# Patient Record
Sex: MALE | Race: ASIAN
[De-identification: names, ages, dates, MRNs, and addresses within clinical notes are randomized per-mention and may not be internally consistent; named-entity substitution may affect disease eponyms.]

---

## 2019-06-20 ENCOUNTER — HOSPITAL ENCOUNTER (EMERGENCY)
Dept: HOSPITAL 61 - ER | Age: 49
Discharge: HOME | End: 2019-06-20
Payer: COMMERCIAL

## 2019-06-20 VITALS — HEIGHT: 65 IN | BODY MASS INDEX: 24.99 KG/M2 | WEIGHT: 150 LBS

## 2019-06-20 VITALS — SYSTOLIC BLOOD PRESSURE: 123 MMHG | DIASTOLIC BLOOD PRESSURE: 75 MMHG

## 2019-06-20 DIAGNOSIS — F41.9: ICD-10-CM

## 2019-06-20 DIAGNOSIS — G89.29: Primary | ICD-10-CM

## 2019-06-20 DIAGNOSIS — M25.561: ICD-10-CM

## 2019-06-20 LAB
APTT PPP: YELLOW S
BACTERIA #/AREA URNS HPF: 0 /HPF
BILIRUB UR QL STRIP: NEGATIVE
FIBRINOGEN PPP-MCNC: CLEAR MG/DL
NITRITE UR QL STRIP: NEGATIVE
PH UR STRIP: 5 [PH]
PROT UR STRIP-MCNC: NEGATIVE MG/DL
RBC #/AREA URNS HPF: 0 /HPF (ref 0–2)
SQUAMOUS #/AREA URNS LPF: (no result) /LPF
UROBILINOGEN UR-MCNC: 0.2 MG/DL
WBC #/AREA URNS HPF: (no result) /HPF (ref 0–4)

## 2019-06-20 PROCEDURE — 82962 GLUCOSE BLOOD TEST: CPT

## 2019-06-20 PROCEDURE — 81001 URINALYSIS AUTO W/SCOPE: CPT

## 2019-06-20 PROCEDURE — 99285 EMERGENCY DEPT VISIT HI MDM: CPT

## 2019-06-20 PROCEDURE — 73564 X-RAY EXAM KNEE 4 OR MORE: CPT

## 2019-06-20 NOTE — RAD
KNEE RIGHT 4V

 

History: Chronic knee pain

 

Comparison: None.

 

Findings:

3 views right knee are submitted. No acute fracture or dislocation is 

identified. There is likely suprapatellar joint effusion.

 

Impression: 

 

1.  No acute osseous abnormality is identified. There is likely 

suprapatellar joint effusion.

 

Electronically signed by: Winston Sarmiento MD (6/20/2019 3:09 PM) 

[email or web address]

## 2019-06-20 NOTE — PHYS DOC
Past Medical History


Past Medical History:  No Pertinent History


Past Surgical History:  No Surgical History


Alcohol Use:  None


Drug Use:  None





Adult General


Chief Complaint


Chief Complaint:  LOWER EXT PAIN





HPI


HPI





Patient is a 48  year old male who presents with several different complaints. 

Patient is not talking English and history was taking with [employer] 

service. Patient complaining of multiple chronic problems including right knee 

pain for 4 years and redness of his feet and eye watering and urinating 

frequently since . Patient came to the [facility] in   and did not have 

follow-up with a primary care physician for his chronic problem.





Review of Systems


Review of Systems





Constitutional: Denies fever or chills []


Eyes: Denies change in visual acuity, redness, or eye pain []


HENT: Denies nasal congestion or sore throat []


Respiratory: Denies cough or shortness of breath []


Cardiovascular: No additional information not addressed in HPI []


GI: Denies abdominal pain, nausea, vomiting, bloody stools or diarrhea []


: Denies dysuria or hematuria , frequent urination[]


Musculoskeletal: Denies back pain, reports joint pain []


Integument: Denies rash or skin lesions []


Neurologic: Denies headache, focal weakness or sensory changes []


Endocrine: Denies polyuria or polydipsia []





All other systems were reviewed and found to be within normal limits, except as 

documented in this note.





Allergies


Allergies





Allergies








Coded Allergies Type Severity Reaction Last Updated Verified


 


  No Known Drug Allergies    19 No











Physical Exam


Physical Exam





Constitutional: Well developed, well nourished, no acute distress, non-toxic 

appearance. []


HENT: Normocephalic, atraumatic


Eyes: PERRLA, EOMI, conjunctiva normal, no discharge. [] 


Neck: Normal range of motion, no tenderness, supple, no stridor. [] 


Cardiovascular:Heart rate regular rhythm, no murmur []


Lungs & Thorax:  Bilateral breath sounds clear to auscultation []


Skin: Warm, dry, no erythema, no rash. [] 


Back: No tenderness, no CVA tenderness. [] 


Extremities: No tenderness, no cyanosis, no clubbing, ROM intact, no edema. 

Right knee without deformity or sign of injury, normal range of motion. [] 


Neurologic: Alert and oriented X 3, normal motor function, normal sensory 

function, no focal deficits noted. []


Psychologic: Affect normal, judgement normal, mood normal. []





Current Patient Data


Vital Signs





                                   Vital Signs








  Date Time  Temp Pulse Resp B/P (MAP) Pulse Ox O2 Delivery O2 Flow Rate FiO2


 


19 13:27 98.4 84 16 123/75 (91) 97 Room Air  





 98.4       








Lab Values





                                Laboratory Tests








Test


 19


13:57 19


14:15


 


Glucose (Fingerstick)


 187 mg/dL


(70-99)  H 





 


Urine Color  Yellow  


 


Urine Clarity  Clear  


 


Urine pH  5.0  


 


Urine Specific Gravity  1.025  


 


Urine Protein


 


 Negative mg/dL


(NEG-TRACE)


 


Urine Glucose (UA)


 


 250 mg/dL


(NEG)


 


Urine Ketones (Stick)


 


 Negative mg/dL


(NEG)


 


Urine Blood


 


 Negative (NEG)





 


Urine Nitrite


 


 Negative (NEG)





 


Urine Bilirubin


 


 Negative (NEG)





 


Urine Urobilinogen Dipstick


 


 0.2 mg/dL (0.2


mg/dL)


 


Urine Leukocyte Esterase


 


 Negative (NEG)





 


Urine RBC  0 /HPF (0-2)  


 


Urine WBC


 


 Occ /HPF (0-4)





 


Urine Squamous Epithelial


Cells 


 Occ /LPF  





 


Urine Bacteria


 


 0 /HPF (0-FEW)














EKG


EKG


[]





Radiology/Procedures


Radiology/Procedures


[][facility]


[address]  [place], [place] [id number] [phone]





IMAGING REPORT





Signed





PATIENT: URSULA DOE   ACCOUNT: [id number]   MRN#: [id number]


: 1970   LOCATION: ER   AGE: 48


SEX: M   EXAM DT: 19   ACCESSION#: [id number]


STATUS: REG ER   ORD. PHYSICIAN: RYAN DE MD   


REASON: chronic knee pain


PROCEDURE: KNEE RIGHT 4V





KNEE RIGHT 4V


 


History: Chronic knee pain


 


Comparison: None.


 


Findings:


3 views right knee are submitted. No acute fracture or dislocation is 


identified. There is likely suprapatellar joint effusion.


 


Impression: 


 


1.  No acute osseous abnormality is identified. There is likely 


suprapatellar joint effusion.


 


Electronically signed by: Romaine Gabriel MD (2019 3:09 PM) 


[email or web address]














DICTATED and SIGNED BY:     ROMAINE GABRIEL MD


DATE:     19 6430





Course & Med Decision Making


Course & Med Decision Making


Pertinent Labs and Imaging studies reviewed. (See chart for details)





Evaluation of patient in ER showed 48-year-old non English speaking  patient 

with complaining of multiple medical problems for a long time. Patient had 

unremarkable physical exam with  blood sugar of 186 and unremarkable UA and x 

ray  for right knee. He was advised to follow up with her primary care physician

 for chronic problem.





Dragon Disclaimer


Dragon Disclaimer


This electronic medical record was generated, in whole or in part, using a voice

 recognition dictation system.





Departure


Departure


Impression:  


   Primary Impression:  


   Anxiety about health


   Additional Impression:  


   Chronic pain


Disposition:   HOME, SELF-CARE (at 1525)


Condition:  STABLE


Patient Instructions:  Chronic Pain, Chronic Pain Management





Additional Instructions:  





Follow-up with your primary care physician in 3-5 days


Return to ER if not getting better


Scripts


Naproxen (NAPROSYN) 500 Mg Tablet


1 TAB PO BID for pain, #20 TAB


   Prov: RYAN DE MD         19





Problem Qualifiers








   Additional Impression:  


   Chronic pain


   Chronic pain type:  other chronic pain  Qualified Codes:  G89.29 - Other 

   chronic pain








RYAN DE MD             2019 14:13

## 2021-01-02 ENCOUNTER — HOSPITAL ENCOUNTER (EMERGENCY)
Dept: HOSPITAL 61 - ER | Age: 51
End: 2021-01-02
Payer: COMMERCIAL

## 2021-01-02 VITALS — BODY MASS INDEX: 26.17 KG/M2 | HEIGHT: 63 IN | WEIGHT: 147.71 LBS

## 2021-01-02 VITALS — DIASTOLIC BLOOD PRESSURE: 76 MMHG | SYSTOLIC BLOOD PRESSURE: 132 MMHG
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
## 2021-01-02 DIAGNOSIS — S81.811A: Primary | ICD-10-CM

## 2021-01-02 PROCEDURE — 12002 RPR S/N/AX/GEN/TRNK2.6-7.5CM: CPT

## 2021-01-02 PROCEDURE — 99283 EMERGENCY DEPT VISIT LOW MDM: CPT

## 2021-01-02 PROCEDURE — 90471 IMMUNIZATION ADMIN: CPT

## 2021-01-02 PROCEDURE — 90715 TDAP VACCINE 7 YRS/> IM: CPT

## 2021-01-02 NOTE — ED.ADGEN
Past Medical History


Past Medical History:  No Pertinent History


Past Surgical History:  No Surgical History


Smoking Status:  Never Smoker


Alcohol Use:  None


Drug Use:  None





General Adult


EDM:


Chief Complaint:  LACERATION/AVULSION





HPI:


HPI:





Patient is a 50  year old male who presents emergency department with complaints

of a laceration to his right lower leg.  Patient reports he was at home when a 

broken glass fell and cut his leg.  He is unsure when his last tetanus shot was.

 Patient denies any decreased range of motion, numbness, tingling, or weakness 

of the affected extremity.  He denies any decreased sensation.  The patient 

currently denies any pain.





Review of Systems:


Review of Systems:


Complete ROS is negative unless otherwise noted in HPI.





Current Medications:





Current Medications








 Medications


  (Trade)  Dose


 Ordered  Sig/Jf  Start Time


 Stop Time Status Last Admin


Dose Admin


 


 Diphtheria/


 Tetanus/Acell


 Pertussis


  (ADACEL TDap


 SYRINGE)  0.5 ml  ONCE ONCE  1/2/21 13:15


 1/2/21 13:16 DC  





 


 Lidocaine/


 Epinephrine


  (LIDOCAINE


 1%-EPI 1:100,000


 Multi-Dose)  1 ml  1X  ONCE  1/2/21 12:00


 1/2/21 12:01 DC  














Allergies:


Allergies:





Allergies








Coded Allergies Type Severity Reaction Last Updated Verified


 


  No Known Drug Allergies    6/20/19 No











Physical Exam:


PE:


See Above


Constitutional: Well developed, well nourished, no acute distress, non-toxic 

appearance. []


HENT: Normocephalic, atraumatic, bilateral external ears normal, nose normal. []


Eyes: PERRLA, EOMI, conjunctiva normal, no discharge. [] 


Neck: Normal range of motion, no stridor. [] 


Cardiovascular:Heart rate regular rhythm


Lungs & Thorax:  Respirations even and unlabored, no retractions, no respiratory

 distress


Skin: Warm, dry, no erythema, no rash; approximately 3.5 cm vertical laceration 

to anterior right lower extremity, no visible foreign body, no active bleeding


Extremities: RLE: no cyanosis, ROM intact, no edema, full extension and flexion 

of foot, 2+ posterior tibial pulse, cap refill less than 2 seconds. [] 


Neurologic: Alert and oriented X 3, no focal deficits noted. []


Psychologic: Affect normal, judgement normal, mood normal. []





Current Patient Data:


Vital Signs:





                                   Vital Signs








  Date Time  Temp Pulse Resp B/P (MAP) Pulse Ox O2 Delivery O2 Flow Rate FiO2


 


1/2/21 11:38 98.4 84 16 132/76 (94) 95 Room Air  





 98.4       











EKG:


EKG:


[]





Heart Score:


Risk Factors:


Risk Factors:  DM, Current or recent (<one month) smoker, HTN, HLP, family hi

story of CAD, obesity.


Risk Scores:


Score 0 - 3:  2.5% MACE over next 6 weeks - Discharge Home


Score 4 - 6:  20.3% MACE over next 6 weeks - Admit for Clinical Observation


Score 7 - 10:  72.7% MACE over next 6 weeks - Early Invasive Strategies





Radiology/Procedures:


Radiology/Procedures:


Laceration Repair by me:


Anesthesia:         1% lidocaine locally with epinephrine


Location:         RLE


Tendon/Joint/Nerves:      No injury


Foreign body:         None detected after copious irrigation and exploration 

with NS and chlorhexidine


Technique:         7 Simple Interrupted Sutures with 4-0 Ethilon


Complexity:          No subcutaneous sutures/mucosal repair/edge excision


Post Closure Length:      3.5 cm





Patient's bleeding was easily controlled in the department and there is no 

indication of anemia.


No evidence of compartment syndrome, neurologic injury, vascular injury, open 

joint, tendon laceration, or foreign body.


Patient is appropriate for outpatient follow up.




















[]





Course & Med Decision Making:


Course & Med Decision Making


Pertinent Labs and Imaging studies reviewed. (See chart for details)





[]





Dragon Disclaimer:


Dragon Disclaimer:


This electronic medical record was generated, in whole or in part, using a voice

 recognition dictation system.





Departure


Departure


Impression:  


   Primary Impression:  


   Laceration of right lower leg without complication


   Additional Impression:  


   Need for Tdap vaccination


Disposition:  01 DC HOME SELF CARE/HOMELESS


Condition:  STABLE


Referrals:  


NO PCP (PCP)


Patient Instructions:  Laceration Care, Adult, Easy-to-Read, VIS, Tetanus, 

Diphtheria (Td); Tetanus, Diphtheria, Pertussis (Tdap) - CDC





Additional Instructions:  


Keep the area clean and dry. You may take Tylenol or ibuprofen as needed for 

pain. Keep the dressing that was placed today on for 24 hours then change the 

dressing twice a day and apply antibiotic ointment to the area. Follow-up with 

your primary care doctor, or return to the emergency room in 10-14 days to have 

the sutures removed, sooner if you develop signs of infection including: 

redness, warmth, drainage, or a fever.





Problem Qualifiers








   Primary Impression:  


   Laceration of right lower leg without complication


   Encounter type:  initial encounter  Qualified Codes:  S81.811A - Laceration 

   without foreign body, right lower leg, initial encounter








NIKOS CHRISTIANSON        Jan 2, 2021 11:31